# Patient Record
Sex: MALE | Race: WHITE | NOT HISPANIC OR LATINO | Employment: OTHER | URBAN - METROPOLITAN AREA
[De-identification: names, ages, dates, MRNs, and addresses within clinical notes are randomized per-mention and may not be internally consistent; named-entity substitution may affect disease eponyms.]

---

## 2017-04-19 ENCOUNTER — TELEPHONE (OUTPATIENT)
Dept: HEMATOLOGY/ONCOLOGY | Facility: CLINIC | Age: 77
End: 2017-04-19

## 2017-04-19 NOTE — TELEPHONE ENCOUNTER
----- Message from Debbi Martinez sent at 4/19/2017  1:43 PM CDT -----  Contact: Self  We have never seen pt.   Called pt and he stated he was a pt of Dr. Cook and would like to become pt of Dr. Rojas.     ----- Message -----     From: Estee Marie     Sent: 4/19/2017   1:19 PM       To: Bob BERRY Staff    Needs the nurse to call the pt back [phone].

## 2017-04-20 NOTE — TELEPHONE ENCOUNTER
----- Message from Estee Marie sent at 4/20/2017  2:12 PM CDT -----  Contact: Self   Returning call please back pt is very hostile [phone]

## 2017-08-01 DIAGNOSIS — R19.7 DIARRHEA, UNSPECIFIED TYPE: ICD-10-CM

## 2017-08-01 RX ORDER — LOPERAMIDE HYDROCHLORIDE 2 MG/1
4 CAPSULE ORAL 2 TIMES DAILY PRN
Qty: 120 CAPSULE | Refills: 5 | Status: SHIPPED | OUTPATIENT
Start: 2017-08-01 | End: 2017-08-11

## 2017-08-01 NOTE — TELEPHONE ENCOUNTER
----- Message from Mare Trevizo MA sent at 8/1/2017  1:25 PM CDT -----  Contact: Home: [phone]   Home: [phone]   Pt is requesting a refill on Loperamide. This is not in his chart but pt states Dr Mcgovern refills it for him.    [id number] - [place], [place] - [address] AT [facility] [phone] (Phone)  [phone] (Fax)

## 2017-10-23 DIAGNOSIS — Z85.038 HISTORY OF COLON CANCER: Primary | ICD-10-CM

## 2017-10-26 ENCOUNTER — HOSPITAL ENCOUNTER (OUTPATIENT)
Dept: RADIOLOGY | Facility: OTHER | Age: 77
Discharge: HOME | End: 2017-10-26
Attending: INTERNAL MEDICINE
Payer: MEDICARE

## 2017-10-26 DIAGNOSIS — Z85.038 HISTORY OF COLON CANCER: ICD-10-CM

## 2017-10-26 PROCEDURE — 74177 CT ABD & PELVIS W/CONTRAST: CPT | Mod: TC

## 2017-10-26 PROCEDURE — 71260 CT THORAX DX C+: CPT | Mod: 26,,, | Performed by: RADIOLOGY

## 2017-10-26 PROCEDURE — 25500020 PHARM REV CODE 255: Performed by: INTERNAL MEDICINE

## 2017-10-26 PROCEDURE — 74177 CT ABD & PELVIS W/CONTRAST: CPT | Mod: 26,,, | Performed by: RADIOLOGY

## 2017-10-26 PROCEDURE — 71260 CT THORAX DX C+: CPT | Mod: TC

## 2017-10-26 RX ADMIN — IOHEXOL 75 ML: 350 INJECTION, SOLUTION INTRAVENOUS at 12:10

## 2017-10-26 RX ADMIN — IOHEXOL 25 ML: 350 INJECTION, SOLUTION INTRAVENOUS at 12:10

## 2017-11-16 DIAGNOSIS — R91.1 LUNG NODULE: Primary | ICD-10-CM

## 2018-01-12 RX ORDER — LOPERAMIDE HYDROCHLORIDE 2 MG/1
CAPSULE ORAL
Qty: 240 CAPSULE | Refills: 0 | Status: SHIPPED | OUTPATIENT
Start: 2018-01-12 | End: 2018-04-09 | Stop reason: SDUPTHER

## 2018-04-09 RX ORDER — LOPERAMIDE HYDROCHLORIDE 2 MG/1
CAPSULE ORAL
Qty: 240 CAPSULE | Refills: 0 | Status: SHIPPED | OUTPATIENT
Start: 2018-04-09 | End: 2018-07-10 | Stop reason: SDUPTHER

## 2018-04-09 NOTE — TELEPHONE ENCOUNTER
Spoke with pt and he wanted a refill on his lomotil sent to a different pharmacy. Request sent to Dr. Mcgovern with new pharmacy.

## 2018-04-09 NOTE — TELEPHONE ENCOUNTER
----- Message from Yesenia Cade sent at 4/9/2018  3:13 PM CDT -----  Contact: pt#[phone]  Pt wants to speak with you about changing medication. Please call

## 2018-04-18 DIAGNOSIS — Z85.048 HISTORY OF RECTAL CANCER: Primary | ICD-10-CM

## 2018-04-18 DIAGNOSIS — R91.8 LUNG NODULES: ICD-10-CM

## 2018-06-08 ENCOUNTER — HOSPITAL ENCOUNTER (OUTPATIENT)
Dept: RADIOLOGY | Facility: OTHER | Age: 78
Discharge: HOME | End: 2018-06-08
Attending: INTERNAL MEDICINE
Payer: MEDICARE

## 2018-06-08 DIAGNOSIS — R91.8 LUNG NODULES: ICD-10-CM

## 2018-06-08 DIAGNOSIS — Z85.048 HISTORY OF RECTAL CANCER: ICD-10-CM

## 2018-06-08 PROCEDURE — 25500020 PHARM REV CODE 255: Performed by: INTERNAL MEDICINE

## 2018-06-08 PROCEDURE — 74177 CT ABD & PELVIS W/CONTRAST: CPT | Mod: TC

## 2018-06-08 PROCEDURE — 74177 CT ABD & PELVIS W/CONTRAST: CPT | Mod: 26,,, | Performed by: INTERNAL MEDICINE

## 2018-06-08 PROCEDURE — 71260 CT THORAX DX C+: CPT | Mod: 26,,, | Performed by: INTERNAL MEDICINE

## 2018-06-08 RX ADMIN — IOHEXOL 75 ML: 350 INJECTION, SOLUTION INTRAVENOUS at 10:06

## 2018-07-10 RX ORDER — LOPERAMIDE HYDROCHLORIDE 2 MG/1
CAPSULE ORAL
Qty: 240 CAPSULE | Refills: 0 | Status: SHIPPED | OUTPATIENT
Start: 2018-07-10 | End: 2018-09-10 | Stop reason: SDUPTHER

## 2018-07-10 NOTE — TELEPHONE ENCOUNTER
----- Message from Chepe Jaime sent at 7/10/2018 12:02 PM CDT -----  Contact: Pt:[phone]  .Needs Advice    Reason for call:  Pt called and states he would like to speak with Naina Dallas's nurse.     Communication Preference:Pt:[phone]  Additional Information:

## 2018-07-23 ENCOUNTER — APPOINTMENT (RX ONLY)
Dept: URBAN - METROPOLITAN AREA CLINIC 98 | Facility: CLINIC | Age: 78
Setting detail: DERMATOLOGY
End: 2018-07-23

## 2018-07-23 DIAGNOSIS — D22 MELANOCYTIC NEVI: ICD-10-CM

## 2018-07-23 DIAGNOSIS — L57.0 ACTINIC KERATOSIS: ICD-10-CM

## 2018-07-23 DIAGNOSIS — D18.0 HEMANGIOMA: ICD-10-CM

## 2018-07-23 DIAGNOSIS — L57.8 OTHER SKIN CHANGES DUE TO CHRONIC EXPOSURE TO NONIONIZING RADIATION: ICD-10-CM

## 2018-07-23 DIAGNOSIS — L82.1 OTHER SEBORRHEIC KERATOSIS: ICD-10-CM

## 2018-07-23 DIAGNOSIS — B07.8 OTHER VIRAL WARTS: ICD-10-CM

## 2018-07-23 PROBLEM — D22.5 MELANOCYTIC NEVI OF TRUNK: Status: ACTIVE | Noted: 2018-07-23

## 2018-07-23 PROBLEM — Z85.46 PERSONAL HISTORY OF MALIGNANT NEOPLASM OF PROSTATE: Status: ACTIVE | Noted: 2018-07-23

## 2018-07-23 PROBLEM — I63.50 CEREBRAL INFARCTION DUE TO UNSPECIFIED OCCLUSION OR STENOSIS OF UNSPECIFIED CEREBRAL ARTERY: Status: ACTIVE | Noted: 2018-07-23

## 2018-07-23 PROBLEM — J30.1 ALLERGIC RHINITIS DUE TO POLLEN: Status: ACTIVE | Noted: 2018-07-23

## 2018-07-23 PROBLEM — D22.39 MELANOCYTIC NEVI OF OTHER PARTS OF FACE: Status: ACTIVE | Noted: 2018-07-23

## 2018-07-23 PROBLEM — D18.01 HEMANGIOMA OF SKIN AND SUBCUTANEOUS TISSUE: Status: ACTIVE | Noted: 2018-07-23

## 2018-07-23 PROBLEM — C18.9 MALIGNANT NEOPLASM OF COLON, UNSPECIFIED: Status: ACTIVE | Noted: 2018-07-23

## 2018-07-23 PROBLEM — M12.9 ARTHROPATHY, UNSPECIFIED: Status: ACTIVE | Noted: 2018-07-23

## 2018-07-23 PROCEDURE — 17003 DESTRUCT PREMALG LES 2-14: CPT

## 2018-07-23 PROCEDURE — ? SUNSCREEN RECOMMENDATIONS

## 2018-07-23 PROCEDURE — 17110 DESTRUCTION B9 LES UP TO 14: CPT

## 2018-07-23 PROCEDURE — ? COUNSELING

## 2018-07-23 PROCEDURE — ? LIQUID NITROGEN

## 2018-07-23 PROCEDURE — 17000 DESTRUCT PREMALG LESION: CPT | Mod: 59

## 2018-07-23 PROCEDURE — 99203 OFFICE O/P NEW LOW 30 MIN: CPT | Mod: 25

## 2018-07-23 ASSESSMENT — LOCATION ZONE DERM
LOCATION ZONE: ARM
LOCATION ZONE: TRUNK
LOCATION ZONE: SCALP
LOCATION ZONE: FACE
LOCATION ZONE: FACE
LOCATION ZONE: NECK

## 2018-07-23 ASSESSMENT — LOCATION SIMPLE DESCRIPTION DERM
LOCATION SIMPLE: TRAPEZIAL NECK
LOCATION SIMPLE: LEFT UPPER ARM
LOCATION SIMPLE: LEFT CHEEK
LOCATION SIMPLE: LOWER BACK
LOCATION SIMPLE: RIGHT UPPER BACK
LOCATION SIMPLE: RIGHT SCALP
LOCATION SIMPLE: POSTERIOR SCALP
LOCATION SIMPLE: RIGHT OCCIPITAL SCALP
LOCATION SIMPLE: SCALP
LOCATION SIMPLE: LEFT CHEEK
LOCATION SIMPLE: CHEST

## 2018-07-23 ASSESSMENT — LOCATION DETAILED DESCRIPTION DERM
LOCATION DETAILED: RIGHT MEDIAL INFERIOR CHEST
LOCATION DETAILED: POSTERIOR MID-PARIETAL SCALP
LOCATION DETAILED: LEFT MEDIAL INFERIOR CHEST
LOCATION DETAILED: LEFT INFERIOR CENTRAL MALAR CHEEK
LOCATION DETAILED: MID-OCCIPITAL SCALP
LOCATION DETAILED: MID TRAPEZIAL NECK
LOCATION DETAILED: RIGHT SUPERIOR OCCIPITAL SCALP
LOCATION DETAILED: LEFT ANTERIOR DISTAL UPPER ARM
LOCATION DETAILED: LEFT SUPERIOR LATERAL MALAR CHEEK
LOCATION DETAILED: LEFT SUPERIOR PARIETAL SCALP
LOCATION DETAILED: RIGHT SUPERIOR MEDIAL UPPER BACK
LOCATION DETAILED: SUPERIOR LUMBAR SPINE
LOCATION DETAILED: RIGHT SUPERIOR PARIETAL SCALP
LOCATION DETAILED: RIGHT CENTRAL FRONTAL SCALP
LOCATION DETAILED: LEFT MEDIAL SUPERIOR CHEST

## 2018-07-23 NOTE — PROCEDURE: LIQUID NITROGEN
Medical Necessity Information: It is in your best interest to select a reason for this procedure from the list below. All of these items fulfill various CMS LCD requirements except the new and changing color options.
Include Z78.9 (Other Specified Conditions Influencing Health Status) As An Associated Diagnosis?: No
Post-Care Instructions: LIQUID NITROGEN TREATMENT Patient Information\\nLiquid Nitrogen is a very cold, liquefied gas with a temperature of 320 degrees below zero. It is used to freeze and destroy a variety of skin lesions such as keratoses and warts. It burns when applied and sometimes for several minutes thereafter. There are certain expectations that you should have following treatment:\\n1. The skin tends to become red and swollen within hours of treatment. In many patients, true blisters occur and are especially common around the fingers and eyelids. A scab then forms which will sometimes remain for 1 ­3 weeks and drop off. The lesion treated will often drop off at that point as well.\\n2. If you get a large or tender blister, you may gently prick the blister with a \"sterilized\" (i.e. soaked in alcohol) needle and allow the blister fluid to drain, but leave the blister roof intact. If the blister isn't bothering you, then it's best to leave it alone.\\n3. Clean the treatment site with soap and water once or twice daily. \\n4. We recommend applying petrolatum (Vaseline, Aquaphor) several times daily to the treated areas for the next 1 to 2 weeks. This will speed up healing, decrease pain, and improve the appearance of any scar that may form. We do not recommend antibiotic ointment (Bacitracin or Polysporin) as this has no superiority to pertolatum and may cause an allergic reaction. If the treated area is in a body­fold (arm pit, groin) then zinc oxide paste (Desitin ointment, A&D ointment, Amada's Butt Paste) may be preferable. A Band­Aid is not necessary unless you find that the area is very weepy. \\n5. In general, you may participate without restriction in your daily activities including sports, swimming, and showering. We do not however recommend getting any wound in fresh or salt water.\\n6. If you have any questions, please contact our office.\\nIMPORTANT: Lesions treated with liquid nitrogen should resolve completely. If a lesion persists beyond 6 weeks we advise you to return to the clinic immediately for re­evaluation. (The  is instructed to get you in immediately.) Warts and Benign Keratoses may require multiple treatment sessions to clear the lesions. Commonly you can develop a white blemish or scar at the treatment site.
Consent: The patient's consent was obtained including but not limited to risks of crusting, scabbing, blistering, scarring, darker or lighter pigmentary change, recurrence, incomplete removal and infection.
Render Post-Care Instructions In Note?: yes
Medical Necessity Clause: This procedure was medically necessary because the lesions that were treated were:
Detail Level: Detailed
Duration Of Freeze Thaw-Cycle (Seconds): 4
Post-Care Instructions: LIQUID NITROGEN TREATMENT Patient Information\\nLiquid Nitrogen is a very cold, liquefied gas with a temperature of 320 degrees below zero. It is used to freeze and destroy a variety of skin lesions such as keratoses and warts. It burns when applied and sometimes for several minutes thereafter. There are certain expectations that you should have following treatment:\\n1. The skin tends to become red and swollen within hours of treatment. In many patients, true blisters occur and are especially common around the fingers and eyelids. A scab then forms which will sometimes remain for 1 ­3 weeks and drop off. The lesion treated will often drop off at that point as well.\\n2. If you get a large or tender blister, you may gently prick the blister with a \"sterilized\" (i.e. soaked in alcohol) needle and allow the blister fluid to drain, but leave the blister roof intact. If the blister isn't bothering you, then it's best to leave it alone.\\n3. Clean the treatment site with soap and water once or twice daily. \\n4. We recommend applying petrolatum (Vaseline, Aquaphor) several times daily to the treated areas for the next 1 to 2 weeks. This will speed up healing, decrease pain, and improve the appearance of any scar that may form. We do not recommend antibiotic ointment (Bacitracin or Polysporin) as this has no superiority to pertolatum and may cause an allergic reaction. If the treated area is in a body­fold (arm pit, groin) then zinc oxide paste (Desitin ointment, A&D ointment, Amada's Butt Paste) may be preferable. A Band­Aid is not necessary unless you find that the area is very weepy. \\n5. In general, you may participate without restriction in your daily activities including sports, swimming, and showering. We do not however recommend getting any wound in fresh or salt water.\\n6. If you have any questions, please contact our office.\\nIMPORTANT: Lesions treated with liquid nitrogen should resolve completely. If a lesion persists beyond 6 weeks we advise you to return to the clinic immediately for re­evaluation. (The  is instructed to get you in immediately.) Warts and Benign Keratoses may require multiple treatment sessions to clear the lesions. Commonly you can develop a white blemish or scar at the treatment site.

## 2018-09-10 NOTE — TELEPHONE ENCOUNTER
----- Message from Charlotte Cazares sent at 9/10/2018  4:29 PM CDT -----  Contact: [phone]  F[facility]il[facility] - calling re his meds - please call patient at

## 2018-09-11 RX ORDER — LOPERAMIDE HYDROCHLORIDE 2 MG/1
CAPSULE ORAL
Qty: 240 CAPSULE | Refills: 3 | Status: SHIPPED | OUTPATIENT
Start: 2018-09-11 | End: 2018-12-27 | Stop reason: SDUPTHER

## 2018-12-27 RX ORDER — LOPERAMIDE HYDROCHLORIDE 2 MG/1
CAPSULE ORAL
Qty: 240 CAPSULE | Refills: 3 | Status: SHIPPED | OUTPATIENT
Start: 2019-05-02 | End: 2019-12-09 | Stop reason: SDUPTHER

## 2019-12-03 ENCOUNTER — OFFICE VISIT (OUTPATIENT)
Dept: URGENT CARE | Facility: CLINIC | Age: 79
End: 2019-12-03
Payer: MEDICARE

## 2019-12-03 VITALS
TEMPERATURE: 98 F | HEART RATE: 64 BPM | BODY MASS INDEX: 20.05 KG/M2 | WEIGHT: 148 LBS | DIASTOLIC BLOOD PRESSURE: 71 MMHG | HEIGHT: 72 IN | OXYGEN SATURATION: 99 % | SYSTOLIC BLOOD PRESSURE: 180 MMHG | RESPIRATION RATE: 18 BRPM

## 2019-12-03 DIAGNOSIS — R55 SYNCOPE, UNSPECIFIED SYNCOPE TYPE: Primary | ICD-10-CM

## 2019-12-03 DIAGNOSIS — S01.01XA SCALP LACERATION, INITIAL ENCOUNTER: ICD-10-CM

## 2019-12-03 DIAGNOSIS — S00.93XA CONTUSION OF HEAD, UNSPECIFIED PART OF HEAD, INITIAL ENCOUNTER: ICD-10-CM

## 2019-12-03 PROCEDURE — 99203 OFFICE O/P NEW LOW 30 MIN: CPT | Mod: S$GLB,,, | Performed by: EMERGENCY MEDICINE

## 2019-12-03 PROCEDURE — 99203 PR OFFICE/OUTPT VISIT, NEW, LEVL III, 30-44 MIN: ICD-10-PCS | Mod: S$GLB,,, | Performed by: EMERGENCY MEDICINE

## 2019-12-03 RX ORDER — MUPIROCIN 20 MG/G
OINTMENT TOPICAL
Qty: 22 G | Refills: 1 | Status: SHIPPED | OUTPATIENT
Start: 2019-12-03

## 2019-12-03 NOTE — PROGRESS NOTES
Subjective:       Patient ID: Graham Jimenez is a 79 y.o. male.    Vitals:  height is 6' (1.829 m) and weight is 67.1 kg (148 lb). His temperature is 97.5 °F (36.4 °C). His blood pressure is 180/71 (abnormal) and his pulse is 64. His respiration is 18 and oxygen saturation is 99%.     Chief Complaint: Head Injury    Pt stated that around 10:30 he fainted in his apartment. He stated that he fell forward hitting his lipbut dont know how he ended up on his back. He does have a laceration on the back of his head     Head Injury    The incident occurred 3 to 6 hours ago. The injury mechanism was a fall. He lost consciousness for a period of less than one minute. The volume of blood lost was minimal. The patient is experiencing no pain. The pain has been constant since the injury. Pertinent negatives include no blurred vision. The treatment provided no relief.       Constitution: Negative for fatigue.   HENT: Negative for facial swelling and facial trauma.    Neck: Negative for neck stiffness.   Cardiovascular: Negative for chest trauma.   Eyes: Negative for eye trauma, double vision and blurred vision.   Gastrointestinal: Negative for abdominal trauma, abdominal pain and rectal bleeding.   Genitourinary: Negative for hematuria, genital trauma and pelvic pain.   Musculoskeletal: Positive for pain. Negative for trauma, joint swelling, abnormal ROM of joint and pain with walking.   Skin: Positive for color change, wound and abrasion. Negative for laceration.   Neurological: Positive for light-headedness and passing out. Negative for dizziness, history of vertigo, coordination disturbances, altered mental status and loss of consciousness.   Hematologic/Lymphatic: Negative for history of bleeding disorder.   Psychiatric/Behavioral: Negative for altered mental status.       Objective:      Physical Exam   Constitutional: He is oriented to person, place, and time. He appears well-developed and well-nourished.   Vertex  scalp v shaped laceration/abrasion, tender   HENT:   Right Ear: External ear normal.   Left Ear: External ear normal.   Nose: Nose normal.   Mouth/Throat: Oropharynx is clear and moist.   Neck: Normal range of motion. Neck supple.   Cardiovascular: Normal rate, regular rhythm and normal heart sounds.   Pulmonary/Chest: Breath sounds normal. He exhibits no tenderness.   Musculoskeletal: Normal range of motion.   Neurological: He is alert and oriented to person, place, and time. No sensory deficit.   Psychiatric: He has a normal mood and affect. His behavior is normal.         Assessment:       1. Syncope, unspecified syncope type    2. Scalp laceration, initial encounter    3. Contusion of head, unspecified part of head, initial encounter        Plan:         Syncope, unspecified syncope type    Scalp laceration, initial encounter    Contusion of head, unspecified part of head, initial encounter         Cristi Gomez MD  Go to the Emergency Department for any problems  Call your PCP for follow up next available.  Pt. Advised to go to ED now for further evaluation of syncope/scalp laceration/abrasion, states only wants antibiotic ointment for head wound, does n ot want x-rays done/sutures/ Tetanus booster, aware of risks such as worsening of condition/death, has no questions.  Bactoban gauze dressing applied to top of scalp/v shaped abrasion/laceration by MA.

## 2019-12-03 NOTE — PATIENT INSTRUCTIONS
Suggest going to the Emergency Department now for further evaluation and treatment.    Critsi Gomez MD  Go to the Emergency Department for any problems  Call your PCP for follow up next available.    Fainting: Uncertain Cause  Fainting (syncope) is a temporary loss of consciousness, which is often associated with a loss of postural tone. There are other causes of fainting, too. Its also called passing out. It occurs when blood flow to the brain is less than normal. Near-fainting (near-syncope) is very similar to fainting, but you dont fully pass out.  Common minor causes of fainting include:  · Sudden fear  · Pain  · Nausea  · Emotional stress  · Overexertion  Suddenly standing up after sitting or lying for a long time can also cause fainting.  More serious causes of fainting include:  · Very slow or very fast heartbeat (arrhythmia)  · Other types of heart disease, such as heart valve disease or coronary artery disease  · Dehydration  · Loss of blood  · Seizure  · Stroke  · Ruptured blood vessel in the brain  Taking too much high blood pressure medicine can also cause low blood pressure and fainting.  Your healthcare provider does not know the exact cause of your fainting. But the tests today did not show any of the serious causes of fainting. Sometimes you may need more tests to find out if you have a serious problem. Thats why its important to follow up with your provider as advised.  Home care  Follow these guidelines when caring for yourself at home:  · Rest today. You may go back to your normal activities when you are feeling back to normal. It is best to stay with someone who can check on you for the next 24 hours to watch for another episode of fainting.  · If you become lightheaded or dizzy, lie down right away and try to prop your feet above the level of your head. Or sit with your head between your knees.  · Because the provider doesnt know the exact cause of your fainting or near-fainting spell,  its possible for you to have another spell without warning. Because of this, dont drive a car or operate dangerous equipment. Dont take a bath alone. Use a shower instead. Dont swim alone until your healthcare provider says that you are no longer in danger of having another fainting spell.  Follow-up care  Follow up with your healthcare provider, or as advised.  When to seek medical advice  Call your healthcare provider right away if any of these occur:  · Another fainting spell thats not explained by the common causes listed above  · Pain in your chest, arm, neck, jaw, back, or abdomen  · Shortness of breath  · Severe headache or seizure  · Blood in vomit or stools (black or red color)  · Unexpected vaginal bleeding  · Your heart beats very rapidly, very slowly, or irregularly (palpitations)  Also call your provider if you have signs of stroke:  · Weakness in an arm or leg or on one side of the face  · Difficulty speaking or seeing  · Extreme drowsiness, confusion, dizziness, or fainting  Date Last Reviewed: 12/1/2016 © 2000-2017 [employer]. [address] [place] [address]. All rights reserved. This information is not intended as a substitute for professional medical care. Always follow your healthcare professional's instructions.        Scalp Contusion  A contusion is another word for bruise. It develops when small blood vessels break open and leak blood into the nearby area. A scalp contusion can result from a bump, hit, or fall. Symptoms can include changes in skin color (bruising). For instance, the skin may turn blue or black. Swelling and pain may also occur.  The swelling from the contusion should go down in a few days. Bruising and pain may take longer to go away.  Home care  General care  · You may use acetaminophen to control pain, unless another pain medicine was prescribed. Dont take aspirin or NSAIDs (nonsteroidal anti-inflammatory drugs) without talking to your provider  first. These medicines increase the risk of bleeding.  · To help reduce swelling and pain, apply a cold source to the injured area for up to 20 minutes at a time. Do this as often as directed. Use a cold pack or bag of ice wrapped in a thin towel. Never put a cold source directly on your skin.  · If you have cuts or scrapes around the site of the contusion, be sure to care for them as directed.  Note about concussion  Because the injury was to your head, it is possible that a concussion (mild brain injury) could result. You dont have symptoms of a concussion at this time. But these can show up later. For this reason, you may be told to watch for symptoms of concussion once youre home. Seek emergency medical care if you have any of the symptoms below over the next hours to days:  · Headache  · Nausea or vomiting  · Dizziness  · Sensitivity to light or noise  · Unusual sleepiness or grogginess  · Trouble falling asleep  · Personality changes  · Vision changes  · Memory loss  · Confusion  · Trouble walking or clumsiness  · Loss of consciousness (even for a short time)  · Inability to be awakened  During the time period that youre watching for concussion symptoms:  · Dont drink alcohol or use sedatives or medicines that make you sleepy.  · Dont drive or operate machinery.  · Dont do anything strenuous, such as heavy lifting or straining.  · Limit tasks that require concentration. This includes reading, watching TV, using a smartphone or computer, and playing video games.  · Dont return to sports, exercise, or other activity that could result in another injury.  Ask your healthcare provider when you can safely resume these activities.   Follow-up care  Follow up with your healthcare provider, or as directed. If imaging tests were done, they will be reviewed by a doctor. You will be told the results and any new findings that may affect your care.  When to seek medical advice  Call your healthcare provider right  away if any of these occur:  · Pain that worsens or that cant be relieved with medicines  · New or increased swelling or bruising  · Fever of 100.4°F (38°C) or higher, or as directed by your provider  · Redness, warmth, or drainage from the injured area  · Any depression or bony abnormality in the injured area  · Fluid drainage or bleeding from the nose or ears  Call 911  Call 911 right away if any of these occur:  · Stiff neck  · Weakness or numbness in any part of the body  · Seizures  Date Last Reviewed: 5/7/2015 © 2000-2017 [employer]. [address] [place] [address]. All rights reserved. This information is not intended as a substitute for professional medical care. Always follow your healthcare professional's instructions.        Scalp Laceration: Sutures or Staples  A laceration is a cut through the skin. A scalp laceration may require stitches (sutures) or staples. There are a lot of blood vessels in the scalp. Because of this, significant bleeding is common with scalp cuts.  Home care  The following guidelines will help you care for your laceration at home:  · During the first 2 days you may carefully rinse your hair in the shower to remove blood and glass or dirt particles. After 2 days you may shower and shampoo your hair normally.  · Have someone help you clean your wound every day:  ¨ In the shower, wash the area with soap and water. Use a wet cotton swab to loosen and remove any blood or crust that forms.  ¨ After cleaning, keep the wound clean and dry. Talk with your doctor about applying antibiotic ointment to the wound. Apply a fresh bandage.  · Don't put your head underwater until the stitches or staples have been removed. This means no swimming.  · Your doctor may prescribe an antibiotic cream or ointment to prevent infection. Do not stop taking this medication until you have finished the prescribed course or your doctor tells you to stop.  · Your doctor may prescribe  medications for pain. If no pain medicines were prescribed, you can use over-the-counter pain medicines. Follow instructions for taking these medications. Talk with your doctor before using these medicines if you have chronic liver or kidney disease. Also talk with your doctor if you have ever had a stomach ulcer or GI bleeding.  Follow-up care  Follow up with your healthcare provider, or as advised. Check the wound daily for the signs of infection listed below. Stitches or staples are usually removed from the scalp in about 7 to 14 days.  Call 911  Call 911 if any of these occur:  · Bleeding can't be controlled by direct pressure  When to seek medical advice  Call your healthcare provider right away if any of these occur:  · Signs of infection, including increasing pain in the wound, redness, swelling, or pus coming from the wound  · Fever of 100.4ºF (38ºC) or higher, or as directed by your healthcare provider  · Stitches or staples come apart or fall out before your next appointment  · Wound edges re-open  Date Last Reviewed: 10/1/2016  © 7473-4036 [employer]. [address] [place] [address], [place] [address]. All rights reserved. This information is not intended as a substitute for professional medical care. Always follow your healthcare professional's instructions.      Scalp laceration sheet ROSSI

## 2019-12-05 ENCOUNTER — TELEPHONE (OUTPATIENT)
Dept: SURGERY | Facility: CLINIC | Age: 79
End: 2019-12-05

## 2019-12-05 NOTE — TELEPHONE ENCOUNTER
----- Message from Yesenia Cade sent at 12/5/2019  2:30 PM CST -----  Contact: pt#[phone]  Pt was a pt of Dr Mcgovern and he wants to know if Dr Solorzano will continue refill loperamide (IMODIUM) 2 mg capsule and ordering yearly mri. Please call

## 2019-12-05 NOTE — TELEPHONE ENCOUNTER
Spoke with patient.  He would like to know if Dr. Solorzano will fill his prescription for Imodium and order yearly CT scans.  Dr. Solorzano will refill his prescription but does not feel that after 10 years that he needs to continue the ct scans.  Will inform him to talk to his PCP about whether he would like to order a colonoscopy for him.

## 2019-12-06 ENCOUNTER — TELEPHONE (OUTPATIENT)
Dept: URGENT CARE | Facility: CLINIC | Age: 79
End: 2019-12-06

## 2019-12-09 ENCOUNTER — TELEPHONE (OUTPATIENT)
Dept: SURGERY | Facility: CLINIC | Age: 79
End: 2019-12-09

## 2019-12-09 RX ORDER — LOPERAMIDE HYDROCHLORIDE 2 MG/1
CAPSULE ORAL
Qty: 240 CAPSULE | Refills: 3 | Status: SHIPPED | OUTPATIENT
Start: 2019-12-09 | End: 2020-07-29 | Stop reason: SDUPTHER

## 2019-12-09 NOTE — TELEPHONE ENCOUNTER
Spoke with patient.  Informed him that Dr. Solorzano sent the Imodium prescription to [facility].

## 2019-12-09 NOTE — TELEPHONE ENCOUNTER
----- Message from Araceli Aguilar MA sent at 12/9/2019  4:09 PM CST -----  Contact: self/[phone]  Pt states he is a former Pt of Dr. Mcgovern and is requesting if Dr. Solorzano can Refill his medication before January.        lorazepam (ATIVAN) 1 MG tablet       ..  [id number] - [place], [place] - [address] [place] [address]  Phone: [phone] Fax: [phone]

## 2019-12-09 NOTE — TELEPHONE ENCOUNTER
Spoke with patient.  Informed him that Dr. Solorzano will send in a prescription for imodium. Dr. Solorzano also suggested that he did not have to repeat the ct scan after being cancer free for 10 years.

## 2019-12-09 NOTE — TELEPHONE ENCOUNTER
----- Message from Araceli Aguilar MA sent at 12/9/2019  4:09 PM CST -----  Contact: self/[phone]  Pt states he is a former Pt of Dr. Mcgovern and is requesting if Dr. Solorzano can Refill his medication before January.        lorazepam (ATIVAN) 1 MG tablet       ..  [id number] - [place], [place] - [address] [place] [address]  Phone: [phone] Fax: [phone]

## 2019-12-09 NOTE — TELEPHONE ENCOUNTER
----- Message from Charlotte Tejeda RN sent at 12/9/2019  4:33 PM CST -----  Contact: self/[phone]  Call with script being sent to [facility]  ----- Message -----  From: Araceli Aguilar MA  Sent: 12/9/2019   4:09 PM CST  To: Rashad STRICKLAND Staff    Pt states he is a former Pt of Dr. Mcgovern and is requesting if Dr. Solorzano can Refill his medication before January.        lorazepam (ATIVAN) 1 MG tablet       ..  [id number] - [place], [place] - [address] [place] [address]  Phone: [phone] Fax: [phone]

## 2020-07-29 DIAGNOSIS — F41.9 ANXIETY: ICD-10-CM

## 2020-07-29 RX ORDER — LORAZEPAM 1 MG/1
1 TABLET ORAL DAILY PRN
Qty: 30 TABLET | Refills: 1 | OUTPATIENT
Start: 2020-07-29

## 2020-07-29 RX ORDER — LOPERAMIDE HYDROCHLORIDE 2 MG/1
2 CAPSULE ORAL 4 TIMES DAILY PRN
Qty: 240 CAPSULE | Refills: 3 | Status: SHIPPED | OUTPATIENT
Start: 2020-07-29 | End: 2021-03-09 | Stop reason: SDUPTHER

## 2020-07-29 NOTE — TELEPHONE ENCOUNTER
----- Message from Mana Munoz sent at 7/29/2020  1:18 PM CDT -----  Regarding: Nurse  Contact: [phone]  Calling in regards to speaking with nurse Porter. No further information given. Please call in afternoon.

## 2020-07-29 NOTE — TELEPHONE ENCOUNTER
Spoke with patient. Would like two week (240) refill for loperamide sent to [facility].

## 2021-03-09 ENCOUNTER — TELEPHONE (OUTPATIENT)
Dept: SURGERY | Facility: CLINIC | Age: 81
End: 2021-03-09

## 2021-03-09 RX ORDER — LOPERAMIDE HYDROCHLORIDE 2 MG/1
2 CAPSULE ORAL 4 TIMES DAILY PRN
Qty: 240 CAPSULE | Refills: 3 | Status: SHIPPED | OUTPATIENT
Start: 2021-03-09 | End: 2021-11-04

## 2022-03-28 ENCOUNTER — TELEPHONE (OUTPATIENT)
Dept: SURGERY | Facility: CLINIC | Age: 82
End: 2022-03-28
Payer: MEDICARE

## 2022-03-28 RX ORDER — LOPERAMIDE HYDROCHLORIDE 2 MG/1
2 CAPSULE ORAL 4 TIMES DAILY PRN
Qty: 240 CAPSULE | Refills: 6 | Status: SHIPPED | OUTPATIENT
Start: 2022-03-28 | End: 2022-04-27

## 2022-03-28 NOTE — TELEPHONE ENCOUNTER
----- Message from Jossie Everett sent at 3/28/2022  2:02 PM CDT -----  Regarding: Refill Request  Regarding:Pt called about refill for loperamide 2mg asked to speak to nurse Charlotte Hernandez           Requesting Call back number:[phone]

## 2023-06-23 ENCOUNTER — TELEPHONE (OUTPATIENT)
Dept: SURGERY | Facility: HOSPITAL | Age: 83
End: 2023-06-23
Payer: MEDICARE

## 2023-06-23 ENCOUNTER — TELEPHONE (OUTPATIENT)
Dept: SURGERY | Facility: CLINIC | Age: 83
End: 2023-06-23
Payer: MEDICARE

## 2023-06-23 DIAGNOSIS — C20 RECTUM CANCER: Primary | ICD-10-CM

## 2023-06-23 RX ORDER — LOPERAMIDE HYDROCHLORIDE 2 MG/1
2 CAPSULE ORAL 4 TIMES DAILY PRN
Qty: 30 CAPSULE | Refills: 3 | Status: SHIPPED | OUTPATIENT
Start: 2023-06-23 | End: 2023-06-27

## 2023-06-23 RX ORDER — LOPERAMIDE HYDROCHLORIDE 2 MG/1
2 CAPSULE ORAL 4 TIMES DAILY PRN
Qty: 30 CAPSULE | Refills: 1 | Status: SHIPPED | OUTPATIENT
Start: 2023-06-23 | End: 2023-06-23

## 2023-06-23 NOTE — TELEPHONE ENCOUNTER
----- Message from Charlotte Ruiz LPN sent at 6/23/2023  1:27 PM CDT -----  Regarding: FW: Refill  Contact: pt @ [phone]    ----- Message -----  From: Gretchen Norwood  Sent: 6/23/2023   1:10 PM CDT  To: Rashad STRICKLAND Staff  Subject: Refill                                           Rx Refill/Request    Is this a Refill or New Rx:refill    Rx Name and Strength:loperamide (IMODIUM) 2 mg capsule    Preferred Pharmacy with phone number:  [id number] - [place], [place] - [address] [place] [address]  Phone: [phone] Fax: [phone]    Communication Preference:pt @ [phone]    Additional Information:

## 2023-06-23 NOTE — TELEPHONE ENCOUNTER
Talked to patient, let him know that medications has been sent in. Patient very appreciative of call.

## 2023-06-23 NOTE — TELEPHONE ENCOUNTER
----- Message from Sadia Avalos sent at 6/23/2023  1:43 PM CDT -----  Regarding: Urgent, refills as soon as possible, currently out  Contact: @[phone]  Please call, to advise about refills @[phone] and to follow up    loperamide (IMODIUM) 2 mg capsule, very concerned about being without medication (please make sure it is in stock)      [id number] - [place], [place] - [address] [place] [address]  Phone: [phone] Fax: [phone]

## 2023-06-27 ENCOUNTER — TELEPHONE (OUTPATIENT)
Dept: SURGERY | Facility: CLINIC | Age: 83
End: 2023-06-27
Payer: MEDICARE

## 2023-06-27 DIAGNOSIS — C20 RECTUM CANCER: Primary | ICD-10-CM

## 2023-06-27 RX ORDER — LOPERAMIDE HYDROCHLORIDE 2 MG/1
2 CAPSULE ORAL 4 TIMES DAILY PRN
Qty: 240 CAPSULE | Refills: 2 | Status: SHIPPED | OUTPATIENT
Start: 2023-06-27 | End: 2024-06-21

## 2023-06-27 NOTE — TELEPHONE ENCOUNTER
----- Message from Jones Sanz RN sent at 6/26/2023  1:19 PM CDT -----  Regarding: FW: Refill  Contact: pt robert Madsen,    Will you be able to send in 240 instead?     ----- Message -----  From: Jeferson Haines  Sent: 6/26/2023  12:51 PM CDT  To: Rashad STRICKLAND Staff  Subject: Refill                                           Pt is calling to speak wit staff/provider about Rx: loperamide (IMODIUM) 2 mg capsule 30, pt states he normally gets 240 capsules. Would like to discuss prescription, please call @[phone]

## 2024-08-12 DIAGNOSIS — C20 RECTUM CANCER: ICD-10-CM

## 2024-08-13 RX ORDER — LOPERAMIDE HYDROCHLORIDE 2 MG/1
CAPSULE ORAL
Qty: 240 CAPSULE | Refills: 2 | Status: SHIPPED | OUTPATIENT
Start: 2024-08-13

## 2024-08-16 ENCOUNTER — TELEPHONE (OUTPATIENT)
Dept: SURGERY | Facility: CLINIC | Age: 84
End: 2024-08-16
Payer: MEDICARE

## 2024-08-16 NOTE — TELEPHONE ENCOUNTER
Spoke with patient, Dr Mcgovern did closure before Karrie, patient thinks it was about 1998. After a lengthy conversation, explained to patient that he needed to be seen by a provider if we are to keep refilling his Imodium. Patient is very difficult to redirect in conversation. States he says it is difficult for him to get out to [facility]. Appt reviewed and mailed.

## 2024-08-16 NOTE — TELEPHONE ENCOUNTER
----- Message from Charlotte Ruiz LPN sent at 8/16/2024  2:23 PM CDT -----  Regarding: FW: Advise  Contact: [phone]    ----- Message -----  From: Frida Segovia  Sent: 8/16/2024   1:46 PM CDT  To: Rashad STRICKLAND Staff  Subject: Advise                                           [facility] calling regarding Patient Advice (message) for # pt is calling to speak with Gwen in office he states it is really important pls advise

## 2024-08-16 NOTE — TELEPHONE ENCOUNTER
Spoke with patient, states that it is difficult to hear me at the location he is at and requesting that I call back later. Patient needs follow up appt for history of rectal cancer, appt scheduled.

## 2024-08-16 NOTE — TELEPHONE ENCOUNTER
----- Message from Gwen Espinosa RN sent at 8/15/2024  5:16 PM CDT -----  Regarding: FW: refill    ----- Message -----  From: Tena Gallardo NP  Sent: 8/15/2024   3:31 PM CDT  To: Gwen Espinosa RN  Subject: FW: refill                                       Please call pt, I honestly cannot see where he has even been seen in our clinic within the last 5 years. Is there any way he could come up for a visit? He's 84 so I'm unsure what his mobility is like. I would just like to be able to lay eyes on the person I am giving meds to  ----- Message -----  From: Malena Teague  Sent: 8/15/2024   2:58 PM CDT  To: Sami Madsen Staff  Subject: refill                                           Pt requesting refill of medication listed. Pls call to discuss. Pt asking for a larger amount of the pills. He would like more then 20.  loperamide (IMODIUM) 2 mg capsule      [id number] - [place], [place] - [address] [facility] [address]  [address] [facility] [address]  Phone: [phone] Fax: [phone]

## 2025-05-16 ENCOUNTER — TELEPHONE (OUTPATIENT)
Dept: SURGERY | Facility: CLINIC | Age: 85
End: 2025-05-16
Payer: MEDICARE

## 2025-05-16 NOTE — TELEPHONE ENCOUNTER
Attempted to return patient's call.  Call went straight to voicemail which instructed me to call back in the afternoon.

## 2025-05-17 ENCOUNTER — NURSE TRIAGE (OUTPATIENT)
Dept: ADMINISTRATIVE | Facility: CLINIC | Age: 85
End: 2025-05-17
Payer: MEDICARE

## 2025-05-17 NOTE — TELEPHONE ENCOUNTER
Patient calling in to request a refill of his imodium 2mg capsules. Last sent in August of 2024 to [facility]. He would like the new prescription sent to [facility] on [address]. Advised a message would be sent to the clinic to address during clinic hours. He does still have a few tablets. Instructed to call back with additional questions or worsening of symptoms. Patient verbalized understanding.     Reason for Disposition   [1] Prescription refill request for NON-ESSENTIAL medicine (i.e., no harm to patient if med not taken) AND [2] triager unable to refill per department policy    Protocols used: Medication Refill and Renewal Call-A-

## 2025-05-19 ENCOUNTER — TELEPHONE (OUTPATIENT)
Dept: SURGERY | Facility: CLINIC | Age: 85
End: 2025-05-19
Payer: MEDICARE

## 2025-05-19 DIAGNOSIS — C20 RECTUM CANCER: ICD-10-CM

## 2025-05-19 RX ORDER — LOPERAMIDE HYDROCHLORIDE 2 MG/1
2 CAPSULE ORAL 4 TIMES DAILY PRN
Qty: 240 CAPSULE | Refills: 1 | Status: SHIPPED | OUTPATIENT
Start: 2025-05-19

## 2025-05-19 NOTE — TELEPHONE ENCOUNTER
Returned Mr Jimenez's Called.  Call went to voicemail again.  Left message informing him that will have to be seen before a refill is ordered.  Awaiting return call.

## 2025-05-19 NOTE — TELEPHONE ENCOUNTER
Spoke with patient. Requesting Imodium refill. Per anil John to refill RX. Clinic appointment not necessary. Ysabel Ragland informed.Patient informed.

## 2025-05-19 NOTE — TELEPHONE ENCOUNTER
Spoke to patient regarding prescription refill.  Explained to patient that a visit was requested  by NP prior to refilling the medication.  Patients stated that he can not come to the clinic. I asked Ysabel Ragland to speak to Mr. Jimenez who told him that she would refill the medication if he makes an appointment.  He refused.  He also sated that he was changing pharmacies because the pharmacy at [facility] is now closed. I involved Virginia Dobbs RN who will take care of Mr Jimenez.

## 2025-05-20 ENCOUNTER — TELEPHONE (OUTPATIENT)
Dept: SURGERY | Facility: CLINIC | Age: 85
End: 2025-05-20
Payer: MEDICARE

## 2025-05-20 NOTE — TELEPHONE ENCOUNTER
Patient called stating that he had not received all of his medication.  Received 20/240.  I explained that it may have been a partial refill. After attempting to explain this several times, I asked for his prescription number and pharmacy information.  I told him that I would contact the pharmacy and call him back.

## 2025-05-20 NOTE — TELEPHONE ENCOUNTER
Ysabel Ragland NP Spoke to pharmacy staff and was told that it was a partial script and the balance of the medication would be mailed to him.  I call Mr. Jimenez and explained that the meds should arrive by mail before Friday.  He stated that he understood.

## 2025-05-21 ENCOUNTER — TELEPHONE (OUTPATIENT)
Dept: SURGERY | Facility: CLINIC | Age: 85
End: 2025-05-21
Payer: MEDICARE

## 2025-05-21 NOTE — TELEPHONE ENCOUNTER
Returned patient's call,  He was calling to say that he is okay, just waiting for the remainder of his medications to be delivered. He's returning calls from answering machine.

## 2025-05-27 ENCOUNTER — TELEPHONE (OUTPATIENT)
Dept: SURGERY | Facility: CLINIC | Age: 85
End: 2025-05-27
Payer: MEDICARE

## 2025-05-27 NOTE — TELEPHONE ENCOUNTER
Spoke to patient at length.  Patient states the the remainder of his medication from his partial refill had not been delivered.  I called [facility] and was informed that his medication is there and ready for pickup.  The medication can be delivered if there is a card on file.  I explained that information to Mr Jimenez and he stated that he understood.  He expressed his disappointment in the handling of this refill.  He stated that he is not happy about the way Mrs Gallardo handled things and is considering starting a case and speaking to Troy Hall.  I informed him that she was concerned that he had not been seen recently, and thought a visit might be beneficial.

## 2025-05-29 ENCOUNTER — TELEPHONE (OUTPATIENT)
Dept: SURGERY | Facility: CLINIC | Age: 85
End: 2025-05-29
Payer: MEDICARE

## 2025-05-29 NOTE — TELEPHONE ENCOUNTER
Spoke to patient regarding upcoming refill.  Informed him that he had one refill before 5/26.  Patient verbalizes understanding

## 2025-06-03 ENCOUNTER — TELEPHONE (OUTPATIENT)
Dept: SURGERY | Facility: CLINIC | Age: 85
End: 2025-06-03
Payer: MEDICARE